# Patient Record
(demographics unavailable — no encounter records)

---

## 2024-12-16 NOTE — IMAGING
[de-identified] : LEFT ELBOW EXAM +ttp at lateral epicondyle and posterolateral elbow. Skin intact No deformity, edema, or ecchymosis Hand with brisk capillary refill, warm and well perfused Motor function intact to AIN, PIN, ulnar nerves Sensation intact median, ulnar, radial nerves Elbow ROM   Flexion 135   Extension to 0   Supination 85   Pronation 85 No elbow instability noted Strength for elbow flexion & extension is 5/5 Hand and wrist motion is intact and full Patient able to make a composite fist

## 2024-12-16 NOTE — HISTORY OF PRESENT ILLNESS
[de-identified] : Age: 59 year F PMHx: Hypothyroidism Hand Dominance: RHD Chief Complaint: Left elbow pain onset approx. November 2024 with NKI. Patient reports that her symptoms onset with no precipitating factors. Patient reports a sharp pain on the lateral aspect of her left elbow, worse with exertion and rotational movements of the left arm. Patient has been taking Aleve PRN with relief. Denies numbness/tingling at this time.  Trauma: NKI Outside Imaging/Treatment: none OTC Medications: Aleve PRN with minimal relief OT/PT: none Bracing: none Pain worse with: movement, weight bearing Pain better with: rest, Aleve

## 2024-12-16 NOTE — IMAGING
[de-identified] : LEFT ELBOW EXAM +ttp at lateral epicondyle and posterolateral elbow. Skin intact No deformity, edema, or ecchymosis Hand with brisk capillary refill, warm and well perfused Motor function intact to AIN, PIN, ulnar nerves Sensation intact median, ulnar, radial nerves Elbow ROM   Flexion 135   Extension to 0   Supination 85   Pronation 85 No elbow instability noted Strength for elbow flexion & extension is 5/5 Hand and wrist motion is intact and full Patient able to make a composite fist

## 2024-12-16 NOTE — HISTORY OF PRESENT ILLNESS
[de-identified] : Age: 59 year F PMHx: Hypothyroidism Hand Dominance: RHD Chief Complaint: Left elbow pain onset approx. November 2024 with NKI. Patient reports that her symptoms onset with no precipitating factors. Patient reports a sharp pain on the lateral aspect of her left elbow, worse with exertion and rotational movements of the left arm. Patient has been taking Aleve PRN with relief. Denies numbness/tingling at this time.  Trauma: NKI Outside Imaging/Treatment: none OTC Medications: Aleve PRN with minimal relief OT/PT: none Bracing: none Pain worse with: movement, weight bearing Pain better with: rest, Aleve

## 2024-12-16 NOTE — ASSESSMENT
[FreeTextEntry1] : Left Lateral Epicondylitis The diagnosis of lateral epicondylitis and treatment options were discussed at length with the patient today.  I discussed that in terms of the natural history of the condition, it can sometimes take many months to improve.  I discussed treatment options including observation, activity modification including avoiding lifting with the hand pronated and instead lifting with the hand supinated, oral anti-inflammatories, hand therapy, counterforce brace, and steroid injection.  I discussed that for the steroid injection, the literature proves this to be no better than a placebo, however, it is still a potential option for the patient.  Furthermore, if this condition is recalcitrant, I discussed obtaining an MRI scan to assess both the lateral epicondyle as well as the radiocapitellar joint and specifically for a potential plica that could be impinging on the radiocapitellar joint. All of the patient's questions were answered to their satisfaction. After discussion of risks/benefits, including glucose intolerance in the diabetic population, patient elected to proceed with CSI to the A1 pulley.  Procedure 1 - 1.0cc 1% lidocaine + 1.0cc 40mg/cc Kenalog administered to the left lateral epicondyle following sterilization with Betadine. Patient tolerated this well.  F/u 3mo